# Patient Record
Sex: MALE | Race: WHITE | NOT HISPANIC OR LATINO | Employment: STUDENT | URBAN - METROPOLITAN AREA
[De-identification: names, ages, dates, MRNs, and addresses within clinical notes are randomized per-mention and may not be internally consistent; named-entity substitution may affect disease eponyms.]

---
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
## 2022-06-07 NOTE — PATIENT INSTRUCTIONS
1  Toe infection  -Take keflex as directed  -Elevate  -Do warm soaks  -Follow-up with PCP within 2 days    Go to ER with worsening symptoms, increased pain, increased redness, fever, streaking up your leg or any new concerns therapeutic paracentesis performed.   4L of albin colored fluid was drained.   Pt tolerated the procedure well.   No complications. negative...

## 2023-08-28 ENCOUNTER — TELEPHONE (OUTPATIENT)
Dept: NEUROLOGY | Facility: CLINIC | Age: 23
End: 2023-08-28

## 2023-08-28 NOTE — TELEPHONE ENCOUNTER
Received  transcription:    Hi, this is Tanisha Wills, Reference to [facility]. I have a question. You called me back saying that he wasn't a patient there. I went to [address] [facility]. I'm pretty sure that was [facility]'s. If you can give me a call back and see, I know he's seen a Chaz when we came down. So if you can give me a call back and kind of help me out because this is phone number I have but [phone]. Thank you.   --------------------------------------------------    Care every where reviewed. It appears that pt was seen by [facility]rology on 7/20/23 by Dr. Kathy Vance and Dr. Patrick Lackey. Spoke with pt's mom and provided telephone number to [facility] neurology. Nothing further at this time.

## 2023-08-28 NOTE — TELEPHONE ENCOUNTER
Received VM from person (possibly mom, caller did not identify herself) informing that pt currently suffering from migraine. Says pt takes sumatriptan and is ineffective. Called back, no answer. LVM informing caller that pt is not established pt of [facility]) Neurology. Will need to call his current neurologist through [facility].

## 2025-04-24 ENCOUNTER — OCCMED (OUTPATIENT)
Age: 25
End: 2025-04-24
Payer: OTHER MISCELLANEOUS

## 2025-04-24 ENCOUNTER — APPOINTMENT (OUTPATIENT)
Age: 25
End: 2025-04-24
Attending: PHYSICIAN ASSISTANT
Payer: OTHER MISCELLANEOUS

## 2025-04-24 DIAGNOSIS — S89.91XA RIGHT KNEE INJURY, INITIAL ENCOUNTER: ICD-10-CM

## 2025-04-24 DIAGNOSIS — S82.091A OTHER CLOSED FRACTURE OF RIGHT PATELLA, INITIAL ENCOUNTER: Primary | ICD-10-CM

## 2025-04-24 PROCEDURE — 73564 X-RAY EXAM KNEE 4 OR MORE: CPT

## 2025-04-24 PROCEDURE — 99213 OFFICE O/P EST LOW 20 MIN: CPT | Performed by: PHYSICIAN ASSISTANT

## 2025-04-28 ENCOUNTER — OCCMED (OUTPATIENT)
Age: 25
End: 2025-04-28
Payer: OTHER MISCELLANEOUS

## 2025-04-28 ENCOUNTER — OFFICE VISIT (OUTPATIENT)
Dept: OBGYN CLINIC | Facility: CLINIC | Age: 25
End: 2025-04-28
Payer: OTHER MISCELLANEOUS

## 2025-04-28 VITALS — HEIGHT: 72 IN | WEIGHT: 312.2 LBS | BODY MASS INDEX: 42.29 KG/M2

## 2025-04-28 DIAGNOSIS — S82.024A CLOSED NONDISPLACED LONGITUDINAL FRACTURE OF RIGHT PATELLA, INITIAL ENCOUNTER: Primary | ICD-10-CM

## 2025-04-28 DIAGNOSIS — S82.091D OTHER CLOSED FRACTURE OF RIGHT PATELLA WITH ROUTINE HEALING, SUBSEQUENT ENCOUNTER: Primary | ICD-10-CM

## 2025-04-28 PROCEDURE — 99213 OFFICE O/P EST LOW 20 MIN: CPT | Performed by: PHYSICIAN ASSISTANT

## 2025-04-28 PROCEDURE — 99203 OFFICE O/P NEW LOW 30 MIN: CPT | Performed by: STUDENT IN AN ORGANIZED HEALTH CARE EDUCATION/TRAINING PROGRAM

## 2025-04-28 NOTE — LETTER
April 28, 2025     Patient: Jacky Hickman   YOB: 2000   Date of Visit: 4/28/2025       To Whom It May Concern:    Jacky Hickman was seen in my clinic on 4/28/2025 at 2:30 pm. Please excuse Jacky for his absence from work on this day to make the appointment.  Please also excuse him from any work duty until May 5, 2025 so that he may begin his initial rehabilitation.  On that date, he may return to work in full capacity.  Please allow him to wear a knee brace during work activities.    If you have any questions or concerns, please don't hesitate to call.         Sincerely,         Manuelito Guardado MD        CC: No Recipients

## 2025-04-28 NOTE — PROGRESS NOTES
Assessment & Plan  Closed nondisplaced longitudinal fracture of right patella, initial encounter  Discussed history, exam, and imaging with patient. Presentation most consistent with possible nondisplaced vertical patella fracture versus bipartite patella and we will plan for nonoperative management at this time.  Discussed oral/topical medication regimen. Will plan for over-the-counter Tylenol and ibuprofen as needed.  Discussed rehabilitation efforts. Will plan for home exercise program, exercise demonstrated in clinic today.  Discussed a plan for return to work next Monday giving him time for initial recovery.  Anticipate he will be able to return to work in full capacity.  Offered a hinged knee brace for additional support.  Patient accepted today.     Return in about 6 weeks (around 6/9/2025) for  3v xray.    _____________________________________________________  CHIEF COMPLAINT:  Chief Complaint   Patient presents with    Right Knee - Fracture, Pain     XR 04/24/25. Patient tripped over something at work and injured his R knee. Patient presents wearing an immobilizer. Denies numbness or tingling that radiates in to the foot. He endorses slight swelling in the knee which alleviates after using ice.       SUBJECTIVE:  Jacky Hickman is a 24 y.o. year old male who presents for evaluation of right knee pain. The issue began this past Wednesday at work.  He works at [employer] in [place].  He notes that he was attempting to move off of an excavator and attempted to step over a blade at which time he tripped and fell straight forward breaking his fall with his right knee.  He had immediate pain and had difficulty walking.  He had swelling that developed on the day of injury.  By the next day he had trouble walking at all.  He presented to urgent care where it was noted that he may have a fracture and he was told to follow-up with orthopedics for evaluation.  Currently, at baseline he does not have much  discomfort at all.  Pain is mostly localized over the lateral aspect of his knee.  No distal numbness or tingling.      PAST MEDICAL HISTORY:  Past Medical History:   Diagnosis Date    ADHD (attention deficit hyperactivity disorder)     Depression     Migraine        PAST SURGICAL HISTORY:  Past Surgical History:   Procedure Laterality Date    MYRINGOTOMY W/ TUBES      TYMPANOSTOMY TUBE PLACEMENT         FAMILY HISTORY:  Family History   Problem Relation Age of Onset    No Known Problems Mother     No Known Problems Brother        SOCIAL HISTORY:  Social History     Tobacco Use    Smoking status: Passive Smoke Exposure - Never Smoker    Smokeless tobacco: Never   Substance Use Topics    Alcohol use: Never    Drug use: Never       MEDICATIONS:    Current Outpatient Medications:     busPIRone (BUSPAR) 10 mg tablet, Take 10 mg by mouth 2 (two) times a day (Patient not taking: Reported on 12/4/2021), Disp: , Rfl:     cloNIDine (CATAPRES) 0.3 mg tablet, Take 0.3 mg by mouth daily at bedtime (Patient not taking: Reported on 12/4/2021), Disp: , Rfl: 2    methylphenidate (CONCERTA) 36 MG ER tablet, 54 mg  (Patient not taking: Reported on 12/4/2021), Disp: , Rfl: 0    ALLERGIES:  No Known Allergies    Review of systems:   Constitutional: Negative for fatigue, fever or loss of apetite.   HENT: Negative.    Respiratory: Negative for shortness of breath, dyspnea.    Cardiovascular: Negative for chest pain/tightness.   Gastrointestinal: Negative for abdominal pain, N/V.   Endocrine: Negative for cold/heat intolerance, unexplained weight loss/gain.   Genitourinary: Negative for flank pain, dysuria, hematuria.   Musculoskeletal: As in HPI   Skin: Negative for rash.    Neurological: Negative for numbness tingling  Psychiatric/Behavioral: Negative for agitation.  _____________________________________________________  PHYSICAL EXAMINATION:    Height 6' (1.829 m), weight (!) 142 kg (312 lb 3.2 oz).    General: well developed and well  nourished, alert, oriented times 3, and appears comfortable  HEENT: Benign, normocephalic, atraumatic  Cardiovascular: regular rate    Pulmonary: No wheezing or stridor  Abdomen: Soft, Nontender  Skin: No masses, erythema, lacerations, fluctation, ulcerations  Neurovascular: as per MS[facility] exam below    MUSCULOSKELETAL EXAMINATION:    Right knee  No bruising/deformity.  Prepatellar swelling present.  TTP over lateral patella  Passive ROM 0 - 125, - crepitus  - Abbey's, - Jean-Pierre's  Stable to varus/valgus stress at 0 and 30 degrees  Normal Lachman  - Anterior Drawer, - Posterior Drawer  1 quadrants patellar translation  5/5 quad, 5/5 hamstring strength; able to perform straight leg raise  SILT all exposed distal distributions  2+ PT pulse        _____________________________________________________  STUDIES REVIEWED:  Images personally reviewed by me today     X-rays from April 24 reviewed today in clinic including AP, internal/external oblique and lateral which demonstrate a lucency to the lateral margin of the patella suggestive of possible longitudinal minimally displaced patella fracture versus bipartite patella.  No other evidence of acute osseous injury.      Manuelito Guardado MD

## 2025-04-28 NOTE — ASSESSMENT & PLAN NOTE
Discussed history, exam, and imaging with patient. Presentation most consistent with possible nondisplaced vertical patella fracture versus bipartite patella and we will plan for nonoperative management at this time.  Discussed oral/topical medication regimen. Will plan for over-the-counter Tylenol and ibuprofen as needed.  Discussed rehabilitation efforts. Will plan for home exercise program, exercise demonstrated in clinic today.  Discussed a plan for return to work next Monday giving him time for initial recovery.  Anticipate he will be able to return to work in full capacity.  Offered a hinged knee brace for additional support.  Patient accepted today.

## 2025-05-01 ENCOUNTER — TELEPHONE (OUTPATIENT)
Dept: OBGYN CLINIC | Facility: CLINIC | Age: 25
End: 2025-05-01

## 2025-05-01 NOTE — TELEPHONE ENCOUNTER
Called pt asked him for his WC info. We need the Claim#,  name and phone number, and claim mailing address.     Pt said he was not given that information. I asked him to reach to his employer to get the WC information and call the office, [phone], with the info.

## 2025-06-06 ENCOUNTER — TELEPHONE (OUTPATIENT)
Dept: OBGYN CLINIC | Facility: CLINIC | Age: 25
End: 2025-06-06

## 2025-06-09 ENCOUNTER — APPOINTMENT (OUTPATIENT)
Dept: RADIOLOGY | Facility: CLINIC | Age: 25
End: 2025-06-09
Attending: STUDENT IN AN ORGANIZED HEALTH CARE EDUCATION/TRAINING PROGRAM

## 2025-06-09 ENCOUNTER — OFFICE VISIT (OUTPATIENT)
Dept: OBGYN CLINIC | Facility: CLINIC | Age: 25
End: 2025-06-09
Payer: OTHER MISCELLANEOUS

## 2025-06-09 VITALS — HEIGHT: 72 IN | BODY MASS INDEX: 41.42 KG/M2 | WEIGHT: 305.8 LBS

## 2025-06-09 DIAGNOSIS — Q74.1 BIPARTITE PATELLA: Primary | ICD-10-CM

## 2025-06-09 PROCEDURE — 73562 X-RAY EXAM OF KNEE 3: CPT

## 2025-06-09 PROCEDURE — 99213 OFFICE O/P EST LOW 20 MIN: CPT | Performed by: STUDENT IN AN ORGANIZED HEALTH CARE EDUCATION/TRAINING PROGRAM

## 2025-06-09 NOTE — PROGRESS NOTES
ASSESSMENT/PLAN:    Assessment & Plan  Closed nondisplaced longitudinal fracture of right patella, initial encounter    Follow-up visit for right nondisplaced patella fracture versus bipartite patella.  Plan for continued nonoperative management at this time.  Discussed oral/topical medication regimen. Will plan for continued over-the-counter Tylenol and ibuprofen as needed.  Discussed rehabilitation efforts. Will plan for home exercise program, exercise demonstrated in clinic today.  Discussed a plan for return to work next Monday giving him time for initial recovery.  Anticipate he will be able to return to work in full capacity.  Offered a hinged knee brace for additional support.  Patient accepted today.     No follow-ups on file.       No follow-ups on file.    _____________________________________________________  CHIEF COMPLAINT:  Chief Complaint   Patient presents with   • Right Knee - Follow-up     Updated XR today. Patient states the knee has been feeling fine.        SUBJECTIVE:  Jacky Hickman returns for follow-up of nondisplaced right patella fracture, approximately 6 week(s) since last visit. At that time, in brief the plan was for: Nonoperative management with oral medication regimen, home exercise program, and hinged knee brace, with follow-up in 6 weeks.     Currently patient presents noting: ***    PAST MEDICAL HISTORY:  Past Medical History[1]    PAST SURGICAL HISTORY:  Past Surgical History[2]    FAMILY HISTORY:  Family History[3]    SOCIAL HISTORY:  Social History[4]    MEDICATIONS:  Current Medications[5]    ALLERGIES:  Allergies[6]    Review of systems:   Constitutional: Negative for fatigue, fever or loss of apetite.   HENT: Negative.    Respiratory: Negative for shortness of breath, dyspnea.    Cardiovascular: Negative for chest pain/tightness.   Gastrointestinal: Negative for abdominal pain, N/V.   Endocrine: Negative for cold/heat intolerance, unexplained weight loss/gain.    Genitourinary: Negative for flank pain, dysuria, hematuria.   Musculoskeletal: As in HPI   Skin: Negative for rash.    Neurological: Negative*** for numbness tingling  Psychiatric/Behavioral: Negative for agitation.  _____________________________________________________  PHYSICAL EXAMINATION:    Height 6' (1.829 m), weight (!) 139 kg (305 lb 12.8 oz).    General: well developed and well nourished, alert, oriented times 3, and appears comfortable  HEENT: Benign, normocephalic, atraumatic  Cardiovascular: regular rate    Pulmonary: No wheezing or stridor  Abdomen: Soft, Nontender  Skin: No masses, erythema, lacerations, fluctation, ulcerations  Neurovascular: as per MSK exam below    MUSCULOSKELETAL EXAMINATION:  Right knee exam  No bruising/deformity.  Prepatellar swelling present.  TTP over lateral patella  Passive ROM 0 - 125, - crepitus  - Abbey's, - Jean-Pierre's  Stable to varus/valgus stress at 0 and 30 degrees  Normal Lachman  - Anterior Drawer, - Posterior Drawer  1 quadrants patellar translation  5/5 quad, 5/5 hamstring strength; able to perform straight leg raise  SILT all exposed distal distributions  2+ PT pulse        _____________________________________________________  STUDIES REVIEWED:  Images personally reviewed by me today ***             [1]  Past Medical History:  Diagnosis Date   • ADHD (attention deficit hyperactivity disorder)    • Depression    • Migraine    [2]  Past Surgical History:  Procedure Laterality Date   • MYRINGOTOMY W/ TUBES     • TYMPANOSTOMY TUBE PLACEMENT     [3]  Family History  Problem Relation Name Age of Onset   • No Known Problems Mother     • No Known Problems Brother     [4]  Social History  Tobacco Use   • Smoking status: Passive Smoke Exposure - Never Smoker   • Smokeless tobacco: Never   Substance Use Topics   • Alcohol use: Never   • Drug use: Never   [5]    Current Outpatient Medications:   •  methylphenidate (CONCERTA) 36 MG ER tablet, 54 mg  (Patient not taking:  Reported on 12/4/2021), Disp: , Rfl: 0[6]  No Known Allergies

## 2025-06-09 NOTE — LETTER
June 9, 2025     Patient: Jacky Hickman  YOB: 2000  Date of Visit: 6/9/2025      To Whom it May Concern:    Jacky Hickman is under my professional care. Jacky was seen in my office on 6/9/2025. Jacky followed up today for repeat x-rays and reevaluation of his right knee.  At this time, he is cleared from an orthopedic perspective to continue working in full capacity without restrictions.  Repeat imaging today was reassuring and that he does not have an injury that would require any surgical intervention at this time.  He has met maximal medical improvement at this time from an orthopedic perspective.    If you have any questions or concerns, please don't hesitate to call.         Sincerely,       Brennon Mclaughlin PA-C          CC: No Recipients

## 2025-06-09 NOTE — ASSESSMENT & PLAN NOTE
Follow-up visit for right nondisplaced patella fracture versus bipartite patella.  Discussed that repeat imaging is reassuring that this was likely bipartite patella.  Plan for continued nonoperative management at this time.  Discussed oral/topical medication regimen. Will plan for continued over-the-counter Tylenol and ibuprofen as needed.  Discussed rehabilitation efforts. Will plan for continued home exercise program for strengthening about the knee.  Discussed that he can continue to work in full capacity without restrictions.  He has met maximal medical improvement from orthopedic perspective at this time.  New work note was provided at today's visit.

## 2025-06-09 NOTE — PROGRESS NOTES
[facility] Follow-up Note    Patient Name:  Jacky Hickman  MRN:  [id number]  Date of last visit: 4/28/2025    Assessment/Plan:     Assessment & Plan  Bipartite patella  Follow-up visit for right nondisplaced patella fracture versus bipartite patella.  Discussed that repeat imaging is reassuring that this was likely bipartite patella.  Plan for continued nonoperative management at this time.  Discussed oral/topical medication regimen. Will plan for continued over-the-counter Tylenol and ibuprofen as needed.  Discussed rehabilitation efforts. Will plan for continued home exercise program for strengthening about the knee.  Discussed that he can continue to work in full capacity without restrictions.  He has met maximal medical improvement from orthopedic perspective at this time.  New work note was provided at today's visit.     Return if symptoms worsen or fail to improve.      Subjective   Jacky Hickman returns for follow-up of nondisplaced right patella fracture, approximately 6 week(s) since last visit. At that time, in brief the plan was for: Nonoperative management with oral medication regimen, home exercise program, and hinged knee brace, with follow-up in 6 weeks.    Currently patient presents noting: Patient notes that he has been doing very well since his last visit.  He returned to work the week after his last visit, and notes no difficulty with his duties.  He notes no pain at today's visit.  He notes that there are not any movements or activities that he does that cause pain to his knee.  He denies any clicking, locking, or instability to the knee.  He denies distal paresthesias.  He has no concerns at this time.        Objective     Ht 6' (1.829 m)   Wt (!) 139 kg (305 lb 12.8 oz)   BMI 41.47 kg/m²     Right knee   No wound, erythema, bruising, swelling, or deformity  Non-TTP about patella, medial or lateral joint line  Passive ROM 0 - 125, - crepitus  - Abbey's, - Jean-Pierre's  Stable  to varus/valgus stress at 0 and 30 degrees  Normal Lachman  - Anterior Drawer, - Posterior Drawer  5/5 quad, 5/5 hamstring strength; able to perform straight leg raise  SILT all exposed distal distributions  2+ PT pulse      Data Review     I have personally reviewed pertinent films in PACS:    Xrays of the right knee taken on 6/9/2025 demonstrate bipartite patella and no acute fracture or dislocation.  Joint space is well-preserved.      Pertinent PMH/Meds/Allergies reviewed as listed below:  Past Medical History[1] Current Medications[2] Allergies[3]       Brennon Mclaughlin PA-C             [1]   Past Medical History:  Diagnosis Date    ADHD (attention deficit hyperactivity disorder)     Depression     Migraine    [2]   Current Outpatient Medications:     methylphenidate (CONCERTA) 36 MG ER tablet, 54 mg  (Patient not taking: Reported on 12/4/2021), Disp: , Rfl: 0  [3] No Known Allergies

## 2025-07-08 ENCOUNTER — HOSPITAL ENCOUNTER (EMERGENCY)
Facility: HOSPITAL | Age: 25
Discharge: HOME | End: 2025-07-08
Attending: EMERGENCY MEDICINE | Admitting: EMERGENCY MEDICINE
Payer: COMMERCIAL

## 2025-07-08 VITALS
SYSTOLIC BLOOD PRESSURE: 141 MMHG | OXYGEN SATURATION: 97 % | HEART RATE: 68 BPM | BODY MASS INDEX: 41.31 KG/M2 | WEIGHT: 305 LBS | HEIGHT: 72 IN | TEMPERATURE: 98.2 F | DIASTOLIC BLOOD PRESSURE: 65 MMHG | RESPIRATION RATE: 18 BRPM

## 2025-07-08 DIAGNOSIS — R53.83 FATIGUE: ICD-10-CM

## 2025-07-08 DIAGNOSIS — E83.42 HYPOMAGNESEMIA: ICD-10-CM

## 2025-07-08 DIAGNOSIS — R53.1 GENERALIZED WEAKNESS: Primary | ICD-10-CM

## 2025-07-08 LAB
ALBUMIN SERPL BCG-MCNC: 4.9 G/DL (ref 3.5–5)
ALP SERPL-CCNC: 64 U/L (ref 34–104)
ALT SERPL W P-5'-P-CCNC: 45 U/L (ref 7–52)
ANION GAP SERPL CALCULATED.3IONS-SCNC: 7 MMOL/L (ref 4–13)
AST SERPL W P-5'-P-CCNC: 26 U/L (ref 13–39)
BACTERIA UR QL AUTO: NORMAL /HPF
BASOPHILS # BLD AUTO: 0.04 THOUSANDS/ÂΜL (ref 0–0.1)
BASOPHILS NFR BLD AUTO: 1 % (ref 0–1)
BILIRUB SERPL-MCNC: 0.6 MG/DL (ref 0.2–1)
BILIRUB UR QL STRIP: NEGATIVE
BUN SERPL-MCNC: 13 MG/DL (ref 5–25)
CALCIUM SERPL-MCNC: 9.6 MG/DL (ref 8.4–10.2)
CHLORIDE SERPL-SCNC: 104 MMOL/L (ref 96–108)
CK SERPL-CCNC: 213 U/L (ref 39–308)
CLARITY UR: CLEAR
CO2 SERPL-SCNC: 27 MMOL/L (ref 21–32)
COLOR UR: YELLOW
CREAT SERPL-MCNC: 1.03 MG/DL (ref 0.6–1.3)
EOSINOPHIL # BLD AUTO: 0.08 THOUSAND/ÂΜL (ref 0–0.61)
EOSINOPHIL NFR BLD AUTO: 1 % (ref 0–6)
ERYTHROCYTE [DISTWIDTH] IN BLOOD BY AUTOMATED COUNT: 13.1 % (ref 11.6–15.1)
EST. AVERAGE GLUCOSE BLD GHB EST-MCNC: 97 MG/DL
GFR SERPL CREATININE-BSD FRML MDRD: 102 ML/MIN/1.73SQ M
GLUCOSE SERPL-MCNC: 86 MG/DL (ref 65–140)
GLUCOSE UR STRIP-MCNC: NEGATIVE MG/DL
HBA1C MFR BLD: 5 %
HCT VFR BLD AUTO: 46.7 % (ref 36.5–49.3)
HGB BLD-MCNC: 15.8 G/DL (ref 12–17)
HGB UR QL STRIP.AUTO: ABNORMAL
IMM GRANULOCYTES # BLD AUTO: 0.01 THOUSAND/UL (ref 0–0.2)
IMM GRANULOCYTES NFR BLD AUTO: 0 % (ref 0–2)
KETONES UR STRIP-MCNC: NEGATIVE MG/DL
LEUKOCYTE ESTERASE UR QL STRIP: NEGATIVE
LYMPHOCYTES # BLD AUTO: 2.26 THOUSANDS/ÂΜL (ref 0.6–4.47)
LYMPHOCYTES NFR BLD AUTO: 29 % (ref 14–44)
MAGNESIUM SERPL-MCNC: 1.7 MG/DL (ref 1.9–2.7)
MCH RBC QN AUTO: 28.7 PG (ref 26.8–34.3)
MCHC RBC AUTO-ENTMCNC: 33.8 G/DL (ref 31.4–37.4)
MCV RBC AUTO: 85 FL (ref 82–98)
MONOCYTES # BLD AUTO: 0.5 THOUSAND/ÂΜL (ref 0.17–1.22)
MONOCYTES NFR BLD AUTO: 6 % (ref 4–12)
NEUTROPHILS # BLD AUTO: 4.98 THOUSANDS/ÂΜL (ref 1.85–7.62)
NEUTS SEG NFR BLD AUTO: 63 % (ref 43–75)
NITRITE UR QL STRIP: NEGATIVE
NON-SQ EPI CELLS URNS QL MICRO: NORMAL /HPF
NRBC BLD AUTO-RTO: 0 /100 WBCS
PH UR STRIP.AUTO: 6 [PH]
PLATELET # BLD AUTO: 328 THOUSANDS/UL (ref 149–390)
PMV BLD AUTO: 10.6 FL (ref 8.9–12.7)
POTASSIUM SERPL-SCNC: 4 MMOL/L (ref 3.5–5.3)
PROT SERPL-MCNC: 7.5 G/DL (ref 6.4–8.4)
PROT UR STRIP-MCNC: NEGATIVE MG/DL
RBC # BLD AUTO: 5.5 MILLION/UL (ref 3.88–5.62)
RBC #/AREA URNS AUTO: NORMAL /HPF
SODIUM SERPL-SCNC: 138 MMOL/L (ref 135–147)
SP GR UR STRIP.AUTO: 1.02
TSH SERPL DL<=0.05 MIU/L-ACNC: 1.34 UIU/ML (ref 0.45–4.5)
UROBILINOGEN UR QL STRIP.AUTO: 0.2 E.U./DL
WBC # BLD AUTO: 7.87 THOUSAND/UL (ref 4.31–10.16)
WBC #/AREA URNS AUTO: NORMAL /HPF

## 2025-07-08 PROCEDURE — 81001 URINALYSIS AUTO W/SCOPE: CPT

## 2025-07-08 PROCEDURE — 84443 ASSAY THYROID STIM HORMONE: CPT

## 2025-07-08 PROCEDURE — 85025 COMPLETE CBC W/AUTO DIFF WBC: CPT

## 2025-07-08 PROCEDURE — 80053 COMPREHEN METABOLIC PANEL: CPT

## 2025-07-08 PROCEDURE — 99285 EMERGENCY DEPT VISIT HI MDM: CPT

## 2025-07-08 PROCEDURE — 36415 COLL VENOUS BLD VENIPUNCTURE: CPT

## 2025-07-08 PROCEDURE — 93005 ELECTROCARDIOGRAM TRACING: CPT

## 2025-07-08 PROCEDURE — 99284 EMERGENCY DEPT VISIT MOD MDM: CPT

## 2025-07-08 PROCEDURE — 83735 ASSAY OF MAGNESIUM: CPT

## 2025-07-08 PROCEDURE — 81003 URINALYSIS AUTO W/O SCOPE: CPT

## 2025-07-08 PROCEDURE — 96360 HYDRATION IV INFUSION INIT: CPT

## 2025-07-08 PROCEDURE — 83036 HEMOGLOBIN GLYCOSYLATED A1C: CPT

## 2025-07-08 PROCEDURE — 82550 ASSAY OF CK (CPK): CPT

## 2025-07-08 RX ORDER — LANOLIN ALCOHOL/MO/W.PET/CERES
400 CREAM (GRAM) TOPICAL ONCE
Status: COMPLETED | OUTPATIENT
Start: 2025-07-08 | End: 2025-07-08

## 2025-07-08 RX ADMIN — Medication 400 MG: at 15:32

## 2025-07-08 RX ADMIN — SODIUM CHLORIDE 1000 ML: 0.9 INJECTION, SOLUTION INTRAVENOUS at 14:04

## 2025-07-08 NOTE — Clinical Note
Jacky Hickman was seen and treated in our emergency department on 7/8/2025.    No restrictions            Diagnosis: Weakness, fatigue.    Jacky  may return to work on return date.    He may return on this date: 07/09/2025         If you have any questions or concerns, please don't hesitate to call.      Jayce Sorto PA-C    ______________________________           _______________          _______________  Hospital Representative                              Date                                Time

## 2025-07-08 NOTE — DISCHARGE INSTRUCTIONS
Immediately return to the emergency room if you experience any new or worsening symptoms or if the symptoms are lasting longer than expected.     Please schedule an appointment with your family doctor for follow-up. We will contact you if your A1c is elevated.  Continue with adequate fluids, watching your diet, and start taking a multivitamin daily.

## 2025-07-08 NOTE — ED PROVIDER NOTES
Time reflects when diagnosis was documented in both MDM as applicable and the Disposition within this note       Time User Action Codes Description Comment    7/8/2025  2:59 PM Jayce Sorto Add [R53.1] Generalized weakness     7/8/2025  3:00 PM Jayce Sorto Add [R53.83] Fatigue     7/8/2025  3:00 PM Jayce Sorto Add [E83.42] Hypomagnesemia           ED Disposition       ED Disposition   Discharge    Condition   Stable    Date/Time   Tue Jul 8, 2025  2:56 PM    Comment   Jacky Hickman discharge to home/self care.                   Assessment & Plan       Medical Decision Making  Patient is a well-appearing 24-year-old male presenting with generalized weakness and fatigue for the past 3 months. He has been dealing with weakness and fatigue for the past 3 months but this has been worse recently with the heat. He reports the weakness is mainly in his b/l upper extremities. Good strength on exam. He works as a . He denies weakness or abdominal pain currently but complains of fatigue. Normal neurologic exam. Lungs clear to auscultation b/l. Soft nontender abdomen.  Plan is to obtain labs and urine. CBC to evaluate for leukocytosis or anemia and chemistry to evaluate for electrolyte abnormality, MARISEL, glucose abnormality, or transaminitis. No recent blood work. Will obtain thyroid studies and check electrolytes today. CK to evaluate for rhabdo/myopathies. UA to evaluate for dehydration/UTI. Not sexually active. ECG to evaluate for arrhythmia.  See ED course for interpretation of labs, imaging, and further medical decision making.   IV fluids for hydration. Mom asked if we could add on an A1c due to the family history of diabetes. This will be a send out. Labs reassuring.  Repleted his mild hypomagnesemia. He is comfortable with discharge home. Recommended that he calls his PCP for follow-up and provided supportive precautions for home.  Dispo: Patient is safe/stable for discharge home and was discharged  home with strict return precautions. Provided verbal and written supportive care instructions for managing his illness. Advised patient to return to the nearest emergency room if he has new or worsening symptoms or if any questions arise. Advised patient to follow-up with his family doctor. Patient is satisfied with care and agrees with management and plan.     Amount and/or Complexity of Data Reviewed  External Data Reviewed: labs, radiology and notes.  Labs: ordered. Decision-making details documented in ED Course.  ECG/medicine tests: ordered and independent interpretation performed.    Risk  OTC drugs.        ED Course as of 07/08/25 1537   Tue Jul 08, 2025 1324 Blood Pressure: 137/75  Vital signs reviewed and within normal limits.   1413 WBC: 7.87  No leukocytosis.   1414 Hemoglobin: 15.8  No anemia.   1452 Comprehensive metabolic panel  Electrolytes WNL. No MARISEL or glucose abnormality. No transaminitis.   1453 Total CK: 213  Negative.   1453 MAGNESIUM(!): 1.7  Will replete with oral magnesium.   1456 Reassessed patient and went over labs with him and his mother. He remains without weakness and is comfortable with discharge home.       Medications   sodium chloride 0.9 % bolus 1,000 mL (0 mL Intravenous Stopped 7/8/25 1504)   magnesium Oxide (MAG-OX) tablet 400 mg (400 mg Oral Given 7/8/25 1532)       ED Risk Strat Scores                    No data recorded        SBIRT 22yo+      Flowsheet Row Most Recent Value   Initial Alcohol Screen: US AUDIT-C     1. How often do you have a drink containing alcohol? 0 Filed at: 07/08/2025 1321   3a. Male UNDER 65: How often do you have five or more drinks on one occasion? 0 Filed at: 07/08/2025 1321   Audit-C Score 0 Filed at: 07/08/2025 1321   HERMILO: How many times in the past year have you...    Used an illegal drug or used a prescription medication for non-medical reasons? Never Filed at: 07/08/2025 1321                            History of Present Illness       Chief  Complaint   Patient presents with    Weakness - Generalized    Fatigue     According to the patient, he has felt weak and tired for the last 3 months, patient also reports abdominal pains that started 3 months ago.       Past Medical History[1]   Past Surgical History[2]   Family History[3]   Social History[4]   E-Cigarette/Vaping      E-Cigarette/Vaping Substances    Nicotine No     THC No     CBD No     Flavoring No     Other No     Unknown No       I have reviewed and agree with the history as documented.     Patient is a 24-year-old male with relevant past medical history of ADHD, depression, migraine, and chronic pain presenting with generalized weakness and fatigue for the past 3 months. He has been dealing with weakness and fatigue for the past 3 months but this has been worse recently with the heat and has been missing work because of it and complaining of his symptoms to his mother who finally convinced him to be seen. Mother reports he has a family doctor but has not seen them in years. He reports the weakness is mainly in his upper extremities and comes and goes along with a nauseous feeling. He last felt nauseous roughly 3 days ago. He last felt weak at work around 9 AM this morning while he was working. He works as a . His only complaint in the ED is fatigue. He denies fevers, chills, headache, dizziness, lightheadedness, neck pain, back pain, chest pain, shortness of breath, abdominal pain, N/V, or urinary symptoms.      History provided by:  Patient and parent (Mother.)   used: No    Fatigue  Associated symptoms: no abdominal pain, no arthralgias, no chest pain, no cough, no diarrhea, no dizziness, no dysuria, no fever, no frequency, no headaches, no nausea, no seizures, no shortness of breath, no urgency and no vomiting    He denies abdominal pain.    Review of Systems   Constitutional:  Positive for fatigue. Negative for chills and fever.   HENT:  Negative for congestion,  ear pain, rhinorrhea and sore throat.    Eyes:  Negative for pain and visual disturbance.   Respiratory:  Negative for cough and shortness of breath.    Cardiovascular:  Negative for chest pain and palpitations.   Gastrointestinal:  Negative for abdominal pain, constipation, diarrhea, nausea and vomiting.   Genitourinary:  Negative for dysuria, frequency, hematuria and urgency.   Musculoskeletal:  Negative for arthralgias and back pain.   Skin:  Negative for color change and rash.   Neurological:  Positive for weakness. Negative for dizziness, seizures, syncope, light-headedness and headaches.   Psychiatric/Behavioral:  Negative for agitation and confusion.            Objective       ED Triage Vitals [07/08/25 1320]   Temperature Pulse Blood Pressure Respirations SpO2 Patient Position - Orthostatic VS   98.2 °F (36.8 °C) 84 137/75 18 98 % Lying      Temp Source Heart Rate Source BP Location FiO2 (%) Pain Score    Temporal -- Right arm -- No Pain      Vitals      Date and Time Temp Pulse SpO2 Resp BP Pain Score FACES Pain Rating User   07/08/25 1500 -- 68 97 % 18 141/65 -- --    07/08/25 1320 98.2 °F (36.8 °C) 84 98 % 18 137/75 No Pain -- [facility] – [place], [place]            Physical Exam  Vitals and nursing note reviewed.   Constitutional:       General: He is not in acute distress.     Appearance: He is well-developed. He is obese. He is not ill-appearing.   HENT:      Head: Normocephalic and atraumatic.     Eyes:      Conjunctiva/sclera: Conjunctivae normal.     Neck:      Meningeal: Brudzinski's sign and Kernig's sign absent.     Cardiovascular:      Rate and Rhythm: Normal rate and regular rhythm.      Heart sounds: No murmur heard.  Pulmonary:      Effort: Pulmonary effort is normal. No respiratory distress.      Breath sounds: Normal breath sounds. No wheezing, rhonchi or rales.   Abdominal:      Palpations: Abdomen is soft.      Tenderness: There is no abdominal tenderness. There is no guarding or rebound.     Musculoskeletal:          General: No swelling.      Cervical back: Neck supple.     Skin:     General: Skin is warm and dry.      Capillary Refill: Capillary refill takes less than 2 seconds.     Neurological:      General: No focal deficit present.      Mental Status: He is alert and oriented to person, place, and time.      GCS: GCS eye subscore is 4. GCS verbal subscore is 5. GCS motor subscore is 6.      Sensory: Sensation is intact.      Motor: Motor function is intact.      Coordination: Coordination is intact.      Gait: Gait is intact.     Psychiatric:         Mood and Affect: Mood normal.         Results Reviewed       Procedure Component Value Units Date/Time    TSH, 3rd generation with Free T4 reflex [[id number]]  (Normal) Collected: 07/08/25 1358    Lab Status: Final result Specimen: Blood from Arm, Left Updated: 07/08/25 1456     TSH 3RD GENERATION 1.336 uIU/mL     Magnesium [[id number]]  (Abnormal) Collected: 07/08/25 1358    Lab Status: Final result Specimen: Blood from Arm, Left Updated: 07/08/25 1435     Magnesium 1.7 mg/dL     Comprehensive metabolic panel [[id number]] Collected: 07/08/25 1358    Lab Status: Final result Specimen: Blood from Arm, Left Updated: 07/08/25 1434     Sodium 138 mmol/L      Potassium 4.0 mmol/L      Chloride 104 mmol/L      CO2 27 mmol/L      ANION GAP 7 mmol/L      BUN 13 mg/dL      Creatinine 1.03 mg/dL      Glucose 86 mg/dL      Calcium 9.6 mg/dL      AST 26 U/L      ALT 45 U/L      Alkaline Phosphatase 64 U/L      Total Protein 7.5 g/dL      Albumin 4.9 g/dL      Total Bilirubin 0.60 mg/dL      eGFR 102 ml/min/1.73sq m     Narrative:      National Kid[employer] Foundation guidelines for Chronic Kidney Disease (CKD):     Stage 1 with normal or high GFR (GFR > 90 mL/min/1.73 square meters)    Stage 2 Mild CKD (GFR = 60-89 mL/min/1.73 square meters)    Stage 3A Moderate CKD (GFR = 45-59 mL/min/1.73 square meters)    Stage 3B Moderate CKD (GFR = 30-44 mL/min/1.73 square meters)    Stage 4  Severe CKD (GFR = 15-29 mL/min/1.73 square meters)    Stage 5 End Stage CKD (GFR <15 mL/min/1.73 square meters)  Note: GFR calculation is accurate only with a steady state creatinine    CK [[id number]]  (Normal) Collected: 07/08/25 1358    Lab Status: Final result Specimen: Blood from Arm, Left Updated: 07/08/25 1434     Total  U/L     Urine Microscopic [[id number]]  (Normal) Collected: 07/08/25 1402    Lab Status: Final result Specimen: Urine, Clean Catch Updated: 07/08/25 1429     RBC, UA 2-4 /hpf      WBC, UA 0-1 /hpf      Epithelial Cells Occasional /hpf      Bacteria, UA None Seen /hpf     UA (URINE) with reflex to Scope [[id number]]  (Abnormal) Collected: 07/08/25 1402    Lab Status: Final result Specimen: Urine, Clean Catch Updated: 07/08/25 1422     Color, UA Yellow     Clarity, UA Clear     Specific Gravity, UA 1.025     pH, UA 6.0     Leukocytes, UA Negative     Nitrite, UA Negative     Protein, UA Negative mg/dl      Glucose, UA Negative mg/dl      Ketones, UA Negative mg/dl      Urobilinogen, UA 0.2 E.U./dl      Bilirubin, UA Negative     Occult Blood, UA 2+    CBC and differential [[id number]] Collected: 07/08/25 1358    Lab Status: Final result Specimen: Blood from Arm, Left Updated: 07/08/25 1413     WBC 7.87 Thousand/uL      RBC 5.50 Million/uL      Hemoglobin 15.8 g/dL      Hematocrit 46.7 %      MCV 85 fL      MCH 28.7 pg      MCHC 33.8 g/dL      RDW 13.1 %      MPV 10.6 fL      Platelets 328 Thousands/uL      nRBC 0 /100 WBCs      Segmented % 63 %      Immature Grans % 0 %      Lymphocytes % 29 %      Monocytes % 6 %      Eosinophils Relative 1 %      Basophils Relative 1 %      Absolute Neutrophils 4.98 Thousands/µL      Absolute Immature Grans 0.01 Thousand/uL      Absolute Lymphocytes 2.26 Thousands/µL      Absolute Monocytes 0.50 Thousand/µL      Eosinophils Absolute 0.08 Thousand/µL      Basophils Absolute 0.04 Thousands/µL     Hemoglobin A1C [[id number]]     Lab Status: No result Specimen:  Blood             No orders to display       ECG 12 Lead Documentation Only    Date/Time: 2025 1:46 PM    Performed by: Jayce Sorto PA-C  Authorized by: Jayce Sorto PA-C    Indications / Diagnosis:  Generalized weakness.  ECG reviewed by me, the ED Provider: yes    Patient location:  ED  Previous ECG:     Comparison to cardiac monitor: Yes    Interpretation:     Interpretation: normal    Rate:     ECG rate:  74    ECG rate assessment: normal    Rhythm:     Rhythm: sinus rhythm    Ectopy:     Ectopy: none    QRS:     QRS axis:  Normal    QRS intervals:  Normal  Conduction:     Conduction: normal    ST segments:     ST segments:  Normal  T waves:     T waves: normal        ED Medication and Procedure Management   Prior to Admission Medications   Prescriptions Last Dose Informant Patient Reported? Taking?   methylphenidate (CONCERTA) 36 MG ER tablet  Self Yes No   Si mg    Patient not taking: Reported on 2021      Facility-Administered Medications: None     Patient's Medications   Discharge Prescriptions    No medications on file     No discharge procedures on file.  ED SEPSIS DOCUMENTATION   Time reflects when diagnosis was documented in both MDM as applicable and the Disposition within this note       Time User Action Codes Description Comment    2025  2:59 PM Jayce Sorto Add [R53.1] Generalized weakness     2025  3:00 PM Jayce Sorto Add [R53.83] Fatigue     2025  3:00 PM Jayce Sorto [E83.42] Hypomagnesemia                      [1]   Past Medical History:  Diagnosis Date    ADHD (attention deficit hyperactivity disorder)     Depression     Migraine    [2]   Past Surgical History:  Procedure Laterality Date    MYRINGOTOMY W/ TUBES      TYMPANOSTOMY TUBE PLACEMENT     [3]   Family History  Problem Relation Name Age of Onset    No Known Problems Mother      No Known Problems Brother     [4]   Social History  Tobacco Use    Smoking status: Passive Smoke Exposure  - Never Smoker    Smokeless tobacco: Never   Substance Use Topics    Alcohol use: Never    Drug use: Never        Jayce Sorto PA-C  07/08/25 1530

## 2025-07-09 LAB
ATRIAL RATE: 74 BPM
P AXIS: 50 DEGREES
PR INTERVAL: 148 MS
QRS AXIS: 32 DEGREES
QRSD INTERVAL: 86 MS
QT INTERVAL: 368 MS
QTC INTERVAL: 408 MS
T WAVE AXIS: 49 DEGREES
VENTRICULAR RATE: 74 BPM

## 2025-07-09 PROCEDURE — 93010 ELECTROCARDIOGRAM REPORT: CPT | Performed by: INTERNAL MEDICINE
